# Patient Record
Sex: MALE | Race: WHITE | NOT HISPANIC OR LATINO | Employment: OTHER | URBAN - METROPOLITAN AREA
[De-identification: names, ages, dates, MRNs, and addresses within clinical notes are randomized per-mention and may not be internally consistent; named-entity substitution may affect disease eponyms.]

---

## 2020-02-03 ENCOUNTER — TELEPHONE (OUTPATIENT)
Dept: FAMILY MEDICINE | Facility: CLINIC | Age: 65
End: 2020-02-03

## 2020-02-03 DIAGNOSIS — I10 HYPERTENSION, UNSPECIFIED TYPE: ICD-10-CM

## 2020-02-03 DIAGNOSIS — Z79.899 ENCOUNTER FOR LONG-TERM (CURRENT) USE OF OTHER MEDICATIONS: ICD-10-CM

## 2020-02-03 DIAGNOSIS — Z00.00 ROUTINE GENERAL MEDICAL EXAMINATION AT A HEALTH CARE FACILITY: Primary | ICD-10-CM

## 2020-02-10 ENCOUNTER — TELEPHONE (OUTPATIENT)
Dept: FAMILY MEDICINE | Facility: CLINIC | Age: 65
End: 2020-02-10

## 2020-02-10 ENCOUNTER — CLINICAL SUPPORT (OUTPATIENT)
Dept: FAMILY MEDICINE | Facility: CLINIC | Age: 65
End: 2020-02-10
Payer: MEDICARE

## 2020-02-10 DIAGNOSIS — I10 HYPERTENSION, UNSPECIFIED TYPE: Primary | ICD-10-CM

## 2020-02-10 DIAGNOSIS — Z79.899 ENCOUNTER FOR LONG-TERM (CURRENT) USE OF OTHER MEDICATIONS: ICD-10-CM

## 2020-02-10 RX ORDER — CHLORTHALIDONE 25 MG/1
25 TABLET ORAL DAILY
Qty: 90 TABLET | Refills: 1 | Status: SHIPPED | OUTPATIENT
Start: 2020-02-10 | End: 2020-02-17 | Stop reason: SDUPTHER

## 2020-02-10 RX ORDER — SULFAMETHOXAZOLE AND TRIMETHOPRIM 800; 160 MG/1; MG/1
1 TABLET ORAL 2 TIMES DAILY
Qty: 180 TABLET | Refills: 1 | Status: SHIPPED | OUTPATIENT
Start: 2020-02-10 | End: 2020-02-17 | Stop reason: SDUPTHER

## 2020-02-10 RX ORDER — CHLORTHALIDONE 25 MG/1
25 TABLET ORAL DAILY
COMMUNITY
End: 2020-02-10 | Stop reason: SDUPTHER

## 2020-02-10 RX ORDER — CLONIDINE HYDROCHLORIDE 0.1 MG/1
0.1 TABLET ORAL ONCE
COMMUNITY
End: 2020-02-10 | Stop reason: SDUPTHER

## 2020-02-10 RX ORDER — CLONIDINE HYDROCHLORIDE 0.1 MG/1
0.1 TABLET ORAL ONCE
Qty: 90 TABLET | Refills: 1 | Status: SHIPPED | OUTPATIENT
Start: 2020-02-10 | End: 2020-02-17

## 2020-02-10 RX ORDER — SULFAMETHOXAZOLE AND TRIMETHOPRIM 800; 160 MG/1; MG/1
1 TABLET ORAL 2 TIMES DAILY
COMMUNITY
End: 2020-02-10 | Stop reason: SDUPTHER

## 2020-02-10 NOTE — TELEPHONE ENCOUNTER
----- Message from Nesha Langford sent at 2/10/2020 11:55 AM CST -----  Pt needs refill on 90 day supply of chlorthalidone 25mg, clonidine .1mg, bactrim ds. Would like to  a hard scripts because he is not sure where he wants to drop off.   [phone]

## 2020-02-10 NOTE — PROGRESS NOTES
Pt brought 3 bottles of meds in. He needs refills on ( chlorthalidone,clonidine, bactrim ds) printed out tb

## 2020-02-17 ENCOUNTER — OFFICE VISIT (OUTPATIENT)
Dept: FAMILY MEDICINE | Facility: CLINIC | Age: 65
End: 2020-02-17
Payer: MEDICARE

## 2020-02-17 VITALS
SYSTOLIC BLOOD PRESSURE: 144 MMHG | BODY MASS INDEX: 32.89 KG/M2 | HEIGHT: 68 IN | WEIGHT: 217 LBS | DIASTOLIC BLOOD PRESSURE: 98 MMHG | HEART RATE: 74 BPM

## 2020-02-17 DIAGNOSIS — I10 HYPERTENSION, UNSPECIFIED TYPE: Primary | ICD-10-CM

## 2020-02-17 DIAGNOSIS — Z12.11 SPECIAL SCREENING FOR MALIGNANT NEOPLASMS, COLON: ICD-10-CM

## 2020-02-17 DIAGNOSIS — Z12.5 SPECIAL SCREENING FOR MALIGNANT NEOPLASM OF PROSTATE: ICD-10-CM

## 2020-02-17 DIAGNOSIS — N41.1 CHRONIC PROSTATITIS: ICD-10-CM

## 2020-02-17 DIAGNOSIS — N52.9 ERECTILE DYSFUNCTION, UNSPECIFIED ERECTILE DYSFUNCTION TYPE: ICD-10-CM

## 2020-02-17 DIAGNOSIS — Z79.899 ENCOUNTER FOR LONG-TERM (CURRENT) USE OF OTHER MEDICATIONS: ICD-10-CM

## 2020-02-17 DIAGNOSIS — M50.00 CERVICAL DISC DISEASE WITH MYELOPATHY: ICD-10-CM

## 2020-02-17 DIAGNOSIS — M51.9 LUMBAR DISC DISEASE: ICD-10-CM

## 2020-02-17 DIAGNOSIS — N40.0 BENIGN PROSTATIC HYPERPLASIA WITHOUT LOWER URINARY TRACT SYMPTOMS: ICD-10-CM

## 2020-02-17 DIAGNOSIS — I10 ESSENTIAL HYPERTENSION: ICD-10-CM

## 2020-02-17 PROCEDURE — 99214 OFFICE O/P EST MOD 30 MIN: CPT | Mod: S$GLB,,, | Performed by: FAMILY MEDICINE

## 2020-02-17 PROCEDURE — 99214 PR OFFICE/OUTPT VISIT, EST, LEVL IV, 30-39 MIN: ICD-10-PCS | Mod: S$GLB,,, | Performed by: FAMILY MEDICINE

## 2020-02-17 RX ORDER — CHLORTHALIDONE 25 MG/1
25 TABLET ORAL DAILY
Qty: 90 TABLET | Refills: 1 | Status: SHIPPED | OUTPATIENT
Start: 2020-02-17 | End: 2020-08-19 | Stop reason: SDUPTHER

## 2020-02-17 RX ORDER — CLONIDINE HYDROCHLORIDE 0.1 MG/1
0.1 TABLET ORAL 2 TIMES DAILY
Qty: 180 TABLET | Refills: 1 | Status: SHIPPED | OUTPATIENT
Start: 2020-02-17 | End: 2020-08-19 | Stop reason: SDUPTHER

## 2020-02-17 RX ORDER — SULFAMETHOXAZOLE AND TRIMETHOPRIM 800; 160 MG/1; MG/1
1 TABLET ORAL 2 TIMES DAILY
Qty: 180 TABLET | Refills: 1 | Status: SHIPPED | OUTPATIENT
Start: 2020-02-17 | End: 2020-05-17

## 2020-02-17 NOTE — PROGRESS NOTES
SUBJECTIVE:    Patient ID: Pineda Grubbs is a 64 y.o. male.    Chief Complaint: Follow-up (6 month//no bottles tb//refused flu,pna shot//order for cscope)    This 64-year-old male measures his blood pressure several times a day at home.  He runs 116-130 systolic over 80-90 diastolic at home.  He has severe white coat hypertension and always has elevated blood pressures in a physician's office.  He does have some chronic low back pains which he likes to use a TNS unit or acupressure to help relieve some sciatica type symptoms.  He has chronic prostatitis and prostate pain. He takes Bactrim DS twice a day for the prostate.  He uses a TNS unit also help the prostate pain resolved for hours.  This patient has no known coronary artery disease but is at risk for that due to his father having a CABG and a valve replacement.    Patient has been working a little bit in the [employer] but has not been doing any vigorous aerobic exercise.  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    He has not had a colonoscopy in over 10 years.  He now agrees to pursue this with Dr. James.      No visits with results within 6 Month(s) from this visit.   Latest known visit with results is:   No results found for any previous visit.       Past Medical History:   Diagnosis Date    Chronic prostatitis     Hypertension      No past surgical history on file.  No family history on file.    Marital Status:   Alcohol History:  reports that he drinks alcohol.  Tobacco History:  reports that he has never smoked. He has never used smokeless tobacco.  Drug History:  reports that he does not use drugs.    Review of patient's allergies indicates:  No Known Allergies    Current Outpatient Medications:     chlorthalidone (HYGROTEN) 25 MG Tab, Take 1 tablet (25 mg total) by mouth once daily., Disp: 90 tablet, Rfl: 1    cloNIDine (CATAPRES)  "0.1 MG tablet, Take 1 tablet (0.1 mg total) by mouth 2 (two) times daily., Disp: 180 tablet, Rfl: 1    sulfamethoxazole-trimethoprim 800-160mg (BACTRIM DS) 800-160 mg Tab, Take 1 tablet by mouth 2 (two) times daily., Disp: 180 tablet, Rfl: 1    Review of Systems   Constitutional: Negative for appetite change, chills, fatigue, fever and unexpected weight change.   HENT: Negative for congestion (springtime allergies, uses otc meds), ear pain and trouble swallowing.    Eyes: Negative for pain, discharge and visual disturbance.   Respiratory: Negative for apnea, cough (clear mucus), shortness of breath and wheezing.    Cardiovascular: Negative for chest pain and leg swelling.   Gastrointestinal: Negative for abdominal pain, blood in stool, constipation, diarrhea, nausea and vomiting.        Bm's  Alternate q 2-3  Days  With daily bm's,.dietary changes can cause it w/ travel & fast foods   Endocrine: Negative for cold intolerance, heat intolerance and polydipsia.   Genitourinary: Negative for dysuria, hematuria, testicular pain and urgency.        Chronic prostate pain , w/ dribbling, Using a TNS units,freq uses Bactrim ds    Musculoskeletal: Positive for arthralgias (knees  ache,fell on his knees recently , Aleve sparingly). Negative for gait problem, joint swelling and myalgias.   Neurological: Negative for dizziness, seizures and numbness.   Psychiatric/Behavioral: Negative for agitation, behavioral problems and hallucinations. The patient is not nervous/anxious.           Objective:      Vitals:    02/17/20 0751 02/17/20 0755   BP: (!) 142/98 (!) 144/98   Pulse: 74    Weight: 98.4 kg (217 lb)    Height: 5' 8" (1.727 m)      Body mass index is 32.99 kg/m².  Physical Exam   Constitutional: He is oriented to person, place, and time. He appears well-developed and well-nourished.   Mildly obese white male in no apparent distress   HENT:   Head: Normocephalic and atraumatic.   Right Ear: External ear normal.   Left Ear: " External ear normal.   Nose: Nose normal.   Mouth/Throat: Oropharynx is clear and moist.   Eyes: Pupils are equal, round, and reactive to light. EOM are normal.   Neck: Normal range of motion. Neck supple. Carotid bruit is not present. No thyromegaly present.   Cardiovascular: Normal rate, regular rhythm, normal heart sounds and intact distal pulses.   No murmur heard.  Pulmonary/Chest: Effort normal and breath sounds normal. He has no wheezes. He has no rales.   Abdominal: Soft. Bowel sounds are normal. He exhibits no distension. There is no hepatosplenomegaly. There is no tenderness.   Abdomen nontender but mildly obese   Musculoskeletal: Normal range of motion. He exhibits no tenderness or deformity.        Lumbar back: Normal. He exhibits no pain and no spasm.   Bends 90 degrees at  waist shoulders and knees have full range of motion.  There is some pain in the right shoulder with manipulation.   Lymphadenopathy:     He has no cervical adenopathy.   Neurological: He is alert and oriented to person, place, and time. No cranial nerve deficit. Coordination normal.   Skin: Skin is warm and dry. No rash noted.   Psychiatric: He has a normal mood and affect. His behavior is normal. Judgment and thought content normal.   Nursing note and vitals reviewed.        Assessment:       1. Hypertension, unspecified type    2. Encounter for long-term (current) use of other medications    3. Lumbar disc disease    4. Cervical disc disease with myelopathy    5. Essential hypertension    6. Erectile dysfunction, unspecified erectile dysfunction type    7. Chronic prostatitis    8. Benign prostatic hyperplasia without lower urinary tract symptoms    9. Special screening for malignant neoplasms, colon    10. Special screening for malignant neoplasm of prostate         Plan:       Hypertension, unspecified type  -     cloNIDine (CATAPRES) 0.1 MG tablet; Take 1 tablet (0.1 mg total) by mouth 2 (two) times daily.  Dispense: 180 tablet;  Refill: 1  -     chlorthalidone (HYGROTEN) 25 MG Tab; Take 1 tablet (25 mg total) by mouth once daily.  Dispense: 90 tablet; Refill: 1  Patient is only able to tolerate clonidine and chlorthalidone for his blood pressures.  He has had numerous side effects with multiple other antihypertensive that have been tried.  Encounter for long-term (current) use of other medications  -     sulfamethoxazole-trimethoprim 800-160mg (BACTRIM DS) 800-160 mg Tab; Take 1 tablet by mouth 2 (two) times daily.  Dispense: 180 tablet; Refill: 1  Refill Bactrim DS for chronic prostatitis   Lumbar disc disease  He is managing this with conservative measures he does not like to take pain pills or over-the-counter medication.  TNS unit can be used  Cervical disc disease with myelopathy  Managing this conservatively  Essential hypertension  -     Comprehensive metabolic panel; Future; Expected date: 02/17/2020  -     Lipid panel; Future; Expected date: 02/17/2020  -     CBC auto differential; Future; Expected date: 02/17/2020  -     TSH w/reflex to FT4; Future; Expected date: 02/17/2020    Erectile dysfunction, unspecified erectile dysfunction type    Chronic prostatitis    Benign prostatic hyperplasia without lower urinary tract symptoms    Special screening for malignant neoplasms, colon  -     Ambulatory referral/consult to Gastroenterology; Future; Expected date: 02/24/2020  Referred to Dr. James for colonoscopy  Special screening for malignant neoplasm of prostate  -     PSA, Screening; Future; Expected date: 02/17/2020  Labs ordered for today.    Follow up in about 6 months (around 8/17/2020) for htn.

## 2020-02-18 LAB
ALBUMIN SERPL-MCNC: 4.3 G/DL (ref 3.6–5.1)
ALBUMIN/CREAT UR: 82 MCG/MG CREAT
ALBUMIN/GLOB SERPL: 1.5 (CALC) (ref 1–2.5)
ALP SERPL-CCNC: 70 U/L (ref 35–144)
ALT SERPL-CCNC: 33 U/L (ref 9–46)
APPEARANCE UR: CLEAR
AST SERPL-CCNC: 32 U/L (ref 10–35)
BACTERIA #/AREA URNS HPF: ABNORMAL /HPF
BACTERIA UR CULT: ABNORMAL
BASOPHILS # BLD AUTO: 39 CELLS/UL (ref 0–200)
BASOPHILS NFR BLD AUTO: 0.6 %
BILIRUB SERPL-MCNC: 0.4 MG/DL (ref 0.2–1.2)
BILIRUB UR QL STRIP: NEGATIVE
BUN SERPL-MCNC: 21 MG/DL (ref 7–25)
BUN/CREAT SERPL: ABNORMAL (CALC) (ref 6–22)
CALCIUM SERPL-MCNC: 9.5 MG/DL (ref 8.6–10.3)
CHLORIDE SERPL-SCNC: 102 MMOL/L (ref 98–110)
CHOLEST SERPL-MCNC: 225 MG/DL
CHOLEST/HDLC SERPL: 4 (CALC)
CO2 SERPL-SCNC: 27 MMOL/L (ref 20–32)
COLOR UR: YELLOW
CREAT SERPL-MCNC: 1.03 MG/DL (ref 0.7–1.25)
CREAT UR-MCNC: 184 MG/DL (ref 20–320)
EOSINOPHIL # BLD AUTO: 221 CELLS/UL (ref 15–500)
EOSINOPHIL NFR BLD AUTO: 3.4 %
ERYTHROCYTE [DISTWIDTH] IN BLOOD BY AUTOMATED COUNT: 13 % (ref 11–15)
GFRSERPLBLD MDRD-ARVRAT: 75 ML/MIN/1.73M2
GLOBULIN SER CALC-MCNC: 2.9 G/DL (CALC) (ref 1.9–3.7)
GLUCOSE SERPL-MCNC: 153 MG/DL (ref 65–99)
GLUCOSE UR QL STRIP: NEGATIVE
HCT VFR BLD AUTO: 49.5 % (ref 38.5–50)
HDLC SERPL-MCNC: 56 MG/DL
HGB BLD-MCNC: 16.5 G/DL (ref 13.2–17.1)
HGB UR QL STRIP: NEGATIVE
HYALINE CASTS #/AREA URNS LPF: ABNORMAL /LPF
KETONES UR QL STRIP: NEGATIVE
LDLC SERPL CALC-MCNC: 130 MG/DL (CALC)
LEUKOCYTE ESTERASE UR QL STRIP: NEGATIVE
LYMPHOCYTES # BLD AUTO: 2171 CELLS/UL (ref 850–3900)
LYMPHOCYTES NFR BLD AUTO: 33.4 %
MCH RBC QN AUTO: 30.6 PG (ref 27–33)
MCHC RBC AUTO-ENTMCNC: 33.3 G/DL (ref 32–36)
MCV RBC AUTO: 91.7 FL (ref 80–100)
MICROALBUMIN UR-MCNC: 15 MG/DL
MONOCYTES # BLD AUTO: 572 CELLS/UL (ref 200–950)
MONOCYTES NFR BLD AUTO: 8.8 %
NEUTROPHILS # BLD AUTO: 3497 CELLS/UL (ref 1500–7800)
NEUTROPHILS NFR BLD AUTO: 53.8 %
NITRITE UR QL STRIP: NEGATIVE
NONHDLC SERPL-MCNC: 169 MG/DL (CALC)
PH UR STRIP: 5.5 [PH] (ref 5–8)
PLATELET # BLD AUTO: 342 THOUSAND/UL (ref 140–400)
PMV BLD REES-ECKER: 9.8 FL (ref 7.5–12.5)
POTASSIUM SERPL-SCNC: 4 MMOL/L (ref 3.5–5.3)
PROT SERPL-MCNC: 7.2 G/DL (ref 6.1–8.1)
PROT UR QL STRIP: ABNORMAL
PSA SERPL-MCNC: 0.4 NG/ML
RBC # BLD AUTO: 5.4 MILLION/UL (ref 4.2–5.8)
RBC #/AREA URNS HPF: ABNORMAL /HPF
SODIUM SERPL-SCNC: 140 MMOL/L (ref 135–146)
SP GR UR STRIP: 1.02 (ref 1–1.03)
SQUAMOUS #/AREA URNS HPF: ABNORMAL /HPF
TRIGL SERPL-MCNC: 242 MG/DL
TSH SERPL-ACNC: 2.8 MIU/L (ref 0.4–4.5)
WBC # BLD AUTO: 6.5 THOUSAND/UL (ref 3.8–10.8)
WBC #/AREA URNS HPF: ABNORMAL /HPF

## 2020-02-24 ENCOUNTER — TELEPHONE (OUTPATIENT)
Dept: FAMILY MEDICINE | Facility: CLINIC | Age: 65
End: 2020-02-24

## 2020-02-24 NOTE — PROGRESS NOTES
Spoke to patient who understood the message and will try to change his diet. Remind Me for labs before OV on 8/19/ba

## 2020-02-24 NOTE — TELEPHONE ENCOUNTER
----- Message from Guero Perla MD sent at 2/22/2020  9:33 PM CST -----  Call patient.  His blood sugar was elevated up to 153.  Also cholesterol elevated to 225 triglycerides 242.  Kidneys liver, thyroid, prostate look normal.  Reduce fast food and fried food in the diet .  Recheck lipids and CMP in 6 months.

## 2020-05-04 ENCOUNTER — TELEPHONE (OUTPATIENT)
Dept: FAMILY MEDICINE | Facility: CLINIC | Age: 65
End: 2020-05-04

## 2020-05-04 NOTE — TELEPHONE ENCOUNTER
----- Message from Rimma Ribeiro sent at 5/4/2020 10:20 AM CDT -----  Pt is having trouble controlling his bp  Pt [phone]

## 2020-05-04 NOTE — TELEPHONE ENCOUNTER
Spoke to pt. He said BP keeps going high. 2 days ago afternoon around 2pm it was 238/112. Had dizziness & sweats, throwing up. Within 3 hours it was 189/105. Last night it was 123/77. Today at 10:30am its was 128/82. Recently increased his magnesium to 500mg QD. At times his BP will go as low as 102/57.     After telling me all of this he asked for an appt IN OFFICE with you. Made him an appt for tomorrow morning.    Printed for Dr Perla

## 2020-05-05 ENCOUNTER — TELEPHONE (OUTPATIENT)
Dept: FAMILY MEDICINE | Facility: CLINIC | Age: 65
End: 2020-05-05

## 2020-05-05 ENCOUNTER — OFFICE VISIT (OUTPATIENT)
Dept: FAMILY MEDICINE | Facility: CLINIC | Age: 65
End: 2020-05-05
Payer: MEDICARE

## 2020-05-05 VITALS
BODY MASS INDEX: 32.13 KG/M2 | DIASTOLIC BLOOD PRESSURE: 98 MMHG | SYSTOLIC BLOOD PRESSURE: 160 MMHG | WEIGHT: 212 LBS | TEMPERATURE: 99 F | HEIGHT: 68 IN | HEART RATE: 76 BPM

## 2020-05-05 DIAGNOSIS — N40.0 BENIGN PROSTATIC HYPERPLASIA WITHOUT LOWER URINARY TRACT SYMPTOMS: ICD-10-CM

## 2020-05-05 DIAGNOSIS — I10 ACCELERATED HYPERTENSION: Primary | ICD-10-CM

## 2020-05-05 DIAGNOSIS — M50.00 CERVICAL DISC DISEASE WITH MYELOPATHY: ICD-10-CM

## 2020-05-05 DIAGNOSIS — M51.9 LUMBAR DISC DISEASE: ICD-10-CM

## 2020-05-05 DIAGNOSIS — F41.9 ANXIETY: ICD-10-CM

## 2020-05-05 DIAGNOSIS — I10 ESSENTIAL HYPERTENSION: ICD-10-CM

## 2020-05-05 DIAGNOSIS — N41.1 CHRONIC PROSTATITIS: ICD-10-CM

## 2020-05-05 PROCEDURE — 99214 OFFICE O/P EST MOD 30 MIN: CPT | Mod: S$GLB,,, | Performed by: FAMILY MEDICINE

## 2020-05-05 PROCEDURE — 99214 PR OFFICE/OUTPT VISIT, EST, LEVL IV, 30-39 MIN: ICD-10-PCS | Mod: S$GLB,,, | Performed by: FAMILY MEDICINE

## 2020-05-05 RX ORDER — ALPRAZOLAM 0.5 MG/1
0.5 TABLET ORAL 2 TIMES DAILY PRN
Qty: 30 TABLET | Refills: 1 | Status: SHIPPED | OUTPATIENT
Start: 2020-05-05 | End: 2020-06-04

## 2020-05-05 NOTE — PROGRESS NOTES
SUBJECTIVE:    Patient ID: Pineda Grubbs is a 64 y.o. male.    Chief Complaint: Hypertension (going up and down, no bottles// SW)    This 64-year-old male comes in concerned because his labile hypertension.  He had an episode of sweating nausea vomiting in the 99 degree temperature he vomited many times that day and found his blood pressure to be 238/112.  He had been taking his chlorthalidone daily and once or twice a day clonidine 0.1 mg he took no extra medicine that day in the blood pressure slowly came down to 112/71 by that night.  Felt foggy all the next day but no muscle aches no fever.    Due to have back pain is [facility] which seemed to help the is back as long as he went 2-3 times per week.  The COVID virus is interrupted these sessions.    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  He still feels obligated help them succeed and will after remained involved with the company more than he planned.  His son plans on moving to [place] and patient's wife also wants to move to [place] to be near grandchildren and her kids.  Patient himself does not well as moved to [place].  Many heated discussions have been under taken the last few weeks.  He wonders if this type of stress is raising his blood pressure.      Office Visit on 02/17/2020   Component Date Value Ref Range Status    Glucose 02/17/2020 153* 65 - 99 mg/dL Final    BUN, Bld 02/17/2020 21  7 - 25 mg/dL Final    Creatinine 02/17/2020 1.03  0.70 - 1.25 mg/dL Final    eGFR if non African American 02/17/2020 75  > OR = 60 mL/min/1.73m2 Final    eGFR if African American 02/17/2020 87  > OR = 60 mL/min/1.73m2 Final    BUN/Creatinine Ratio 02/17/2020 NOT APPLICABLE  6 - 22 (calc) Final    Sodium 02/17/2020 140  135 - 146 mmol/L Final    Potassium 02/17/2020 4.0  3.5 - 5.3 mmol/L Final    Chloride 02/17/2020 102  98 - 110  mmol/L Final    CO2 02/17/2020 27  20 - 32 mmol/L Final    Calcium 02/17/2020 9.5  8.6 - 10.3 mg/dL Final    Total Protein 02/17/2020 7.2  6.1 - 8.1 g/dL Final    Albumin 02/17/2020 4.3  3.6 - 5.1 g/dL Final    Globulin, Total 02/17/2020 2.9  1.9 - 3.7 g/dL (calc) Final    Albumin/Globulin Ratio 02/17/2020 1.5  1.0 - 2.5 (calc) Final    Total Bilirubin 02/17/2020 0.4  0.2 - 1.2 mg/dL Final    Alkaline Phosphatase 02/17/2020 70  35 - 144 U/L Final    AST 02/17/2020 32  10 - 35 U/L Final    ALT 02/17/2020 33  9 - 46 U/L Final    Cholesterol 02/17/2020 225* <200 mg/dL Final    HDL 02/17/2020 56  > OR = 40 mg/dL Final    Triglycerides 02/17/2020 242* <150 mg/dL Final    LDL Cholesterol 02/17/2020 130* mg/dL (calc) Final    Hdl/Cholesterol Ratio 02/17/2020 4.0  <5.0 (calc) Final    Non HDL Chol. (LDL+VLDL) 02/17/2020 169* <130 mg/dL (calc) Final    PROSTATE SPECIFIC ANTIGEN, SCR - Q* 02/17/2020 0.4  < OR = 4.0 ng/mL Final    WBC 02/17/2020 6.5  3.8 - 10.8 Thousand/uL Final    RBC 02/17/2020 5.40  4.20 - 5.80 Million/uL Final    Hemoglobin 02/17/2020 16.5  13.2 - 17.1 g/dL Final    Hematocrit 02/17/2020 49.5  38.5 - 50.0 % Final    Mean Corpuscular Volume 02/17/2020 91.7  80.0 - 100.0 fL Final    Mean Corpuscular Hemoglobin 02/17/2020 30.6  27.0 - 33.0 pg Final    Mean Corpuscular Hemoglobin Conc 02/17/2020 33.3  32.0 - 36.0 g/dL Final    RDW 02/17/2020 13.0  11.0 - 15.0 % Final    Platelets 02/17/2020 342  140 - 400 Thousand/uL Final    MPV 02/17/2020 9.8  7.5 - 12.5 fL Final    Neutrophils Absolute 02/17/2020 3,497  1,500 - 7,800 cells/uL Final    Lymph # 02/17/2020 2,171  850 - 3,900 cells/uL Final    Mono # 02/17/2020 572  200 - 950 cells/uL Final    Eos # 02/17/2020 221  15 - 500 cells/uL Final    Baso # 02/17/2020 39  0 - 200 cells/uL Final    Neutrophils Relative 02/17/2020 53.8  % Final    Lymph% 02/17/2020 33.4  % Final    Mono% 02/17/2020 8.8  % Final    Eosinophil% 02/17/2020  3.4  % Final    Basophil% 02/17/2020 0.6  % Final    TSH w/reflex to FT4 02/17/2020 2.80  0.40 - 4.50 mIU/L Final    Creatinine, Random Ur 02/17/2020 184  20 - 320 mg/dL Final    Microalb, Ur 02/17/2020 15.0  See Note: mg/dL Final    Microalb Creat Ratio 02/17/2020 82* <30 mcg/mg creat Final    Color, UA 02/17/2020 YELLOW  YELLOW Final    Appearance, UA 02/17/2020 CLEAR  CLEAR Final    Specific [place], UA 02/17/2020 1.024  1.001 - 1.035 Final    pH, UA 02/17/2020 5.5  5.0 - 8.0 Final    Glucose, UA 02/17/2020 NEGATIVE  NEGATIVE Final    Bilirubin, UA 02/17/2020 NEGATIVE  NEGATIVE Final    Ketones, UA 02/17/2020 NEGATIVE  NEGATIVE Final    Occult Blood UA 02/17/2020 NEGATIVE  NEGATIVE Final    Protein, UA 02/17/2020 1+* NEGATIVE Final    Nitrite, UA 02/17/2020 NEGATIVE  NEGATIVE Final    Leukocytes, UA 02/17/2020 NEGATIVE  NEGATIVE Final    WBC Casts, UA 02/17/2020 0-5  < OR = 5 /HPF Final    RBC Casts, UA 02/17/2020 0-2  < OR = 2 /HPF Final    Squam Epithel, UA 02/17/2020 NONE SEEN  < OR = 5 /HPF Final    Bacteria, UA 02/17/2020 NONE SEEN  NONE SEEN /HPF Final    Hyaline Casts, UA 02/17/2020 NONE SEEN  NONE SEEN /LPF Final    Reflexive Urine Culture 02/17/2020 NO CULTURE INDICATED   Final       Past Medical History:   Diagnosis Date    Chronic prostatitis     Hypertension      History reviewed. No pertinent surgical history.  History reviewed. No pertinent family history.    Marital Status:   Alcohol History:  reports that he drinks alcohol.  Tobacco History:  reports that he has never smoked. He has never used smokeless tobacco.  Drug History:  reports that he does not use drugs.    Review of patient's allergies indicates:  No Known Allergies    Current Outpatient Medications:     ALPRAZolam (XANAX) 0.5 MG tablet, Take 1 tablet (0.5 mg total) by mouth 2 (two) times daily as needed for Anxiety., Disp: 30 tablet, Rfl: 1    chlorthalidone (HYGROTEN) 25 MG Tab, Take 1 tablet (25 mg  "total) by mouth once daily., Disp: 90 tablet, Rfl: 1    cloNIDine (CATAPRES) 0.1 MG tablet, Take 1 tablet (0.1 mg total) by mouth 2 (two) times daily., Disp: 180 tablet, Rfl: 1    sulfamethoxazole-trimethoprim 800-160mg (BACTRIM DS) 800-160 mg Tab, Take 1 tablet by mouth 2 (two) times daily., Disp: 180 tablet, Rfl: 1    Review of Systems   Constitutional: Negative for appetite change, chills, fatigue, fever and unexpected weight change.   HENT: Negative for congestion, ear pain and trouble swallowing.    Eyes: Negative for pain, discharge and visual disturbance.   Respiratory: Negative for apnea, cough, shortness of breath and wheezing.    Cardiovascular: Negative for chest pain and leg swelling.   Gastrointestinal: Negative for abdominal pain, blood in stool, constipation, diarrhea, nausea and vomiting.        Did have 1 episode of vomiting as described in history of present illness.   Endocrine: Negative for cold intolerance, heat intolerance and polydipsia.   Genitourinary: Negative for dysuria, hematuria, testicular pain and urgency.        Stays on prolonged Bactrim due to chronic prostatitis   Musculoskeletal: Positive for back pain (chiropracter helped the  back pains, but stopped due to covid -19). Negative for gait problem, joint swelling and myalgias.   Neurological: Negative for dizziness, seizures and numbness.   Psychiatric/Behavioral: Positive for decreased concentration, dysphoric mood and sleep disturbance (lots of planning the unsettled future). Negative for agitation, behavioral problems and hallucinations. The patient is nervous/anxious.         Lots of  Family stress, son moving to [place], low motivation , some  Depression,           Objective:      Vitals:    05/05/20 0826 05/05/20 0830   BP: (!) 148/92 (!) 160/98   Pulse: 76    Temp: 98.6 °F (37 °C)    Weight: 96.2 kg (212 lb)    Height: 5' 8" (1.727 m)      Body mass index is 32.23 kg/m².  Physical Exam   Constitutional: He is oriented to " person, place, and time. He appears well-developed and well-nourished.   HENT:   Head: Normocephalic and atraumatic.   Right Ear: External ear normal.   Left Ear: External ear normal.   Nose: Nose normal.   Mouth/Throat: Oropharynx is clear and moist.   Eyes: Pupils are equal, round, and reactive to light. EOM are normal.   Neck: Normal range of motion. Neck supple. Carotid bruit is not present. No thyromegaly present.   Cardiovascular: Normal rate, regular rhythm, normal heart sounds and intact distal pulses.   No murmur heard.  Pulmonary/Chest: Effort normal and breath sounds normal. He has no wheezes. He has no rales.   Abdominal: Soft. Bowel sounds are normal. He exhibits no distension. There is no hepatosplenomegaly. There is no tenderness.   Musculoskeletal: Normal range of motion. He exhibits no tenderness or deformity.        Lumbar back: Normal. He exhibits no pain and no spasm.   Bends 90 degrees at  Waist, crepitant knees  bilat    Lymphadenopathy:     He has no cervical adenopathy.   Neurological: He is alert and oriented to person, place, and time. No cranial nerve deficit. Coordination normal.   Skin: Skin is warm and dry. No rash noted.   Psychiatric: He has a normal mood and affect. His behavior is normal. Judgment and thought content normal.   Nursing note and vitals reviewed.        Assessment:       1. Accelerated hypertension    2. Lumbar disc disease    3. Cervical disc disease with myelopathy    4. Essential hypertension    5. Chronic prostatitis    6. Benign prostatic hyperplasia without lower urinary tract symptoms    7. Anxiety         Plan:       Accelerated hypertension  For year of patient's blood pressure diary shows extremely high pressures on 05/03/2020 all at 238/112.  S today blood pressures were averaging 119/80 to 106/60 without any additional medications.  Stress and anxiety could be playing a role.  Also suggested rare diseases such as pheochromocytoma can cause episodic spikes  up blood pressure.  Urine test and blood work can be ordered if this happens again.  Lumbar disc disease  Okay to resume chiropractic care when virus restrictions are lifted.  Cervical disc disease with myelopathy    Essential hypertension    Chronic prostatitis  Continue Bactrim DS daily  Benign prostatic hyperplasia without lower urinary tract symptoms    Anxiety  -     ALPRAZolam (XANAX) 0.5 MG tablet; Take 1 tablet (0.5 mg total) by mouth 2 (two) times daily as needed for Anxiety.  Dispense: 30 tablet; Refill: 1  Trial of alprazolam if stress gets out of hand.    No follow-ups on file.

## 2020-05-05 NOTE — TELEPHONE ENCOUNTER
----- Message from Pilar Medrano sent at 5/5/2020  2:52 PM CDT -----  Pt is requesting we email him a copy of the BP diary that he turned in to [email or web address]  cb # [phone]

## 2020-05-05 NOTE — TELEPHONE ENCOUNTER
----- Message from Saige Palomino MA sent at 5/5/2020  4:15 PM CDT -----  Pt print a copy of pt's BP log (he brought it in to the office today) and he will  at the office tomorrow.    He called in earlier about having a copy emailed to him.

## 2020-08-11 ENCOUNTER — TELEPHONE (OUTPATIENT)
Dept: FAMILY MEDICINE | Facility: CLINIC | Age: 65
End: 2020-08-11

## 2020-08-11 DIAGNOSIS — Z79.899 ENCOUNTER FOR LONG-TERM (CURRENT) USE OF OTHER MEDICATIONS: ICD-10-CM

## 2020-08-11 DIAGNOSIS — R73.09 ELEVATED GLUCOSE LEVEL: ICD-10-CM

## 2020-08-11 DIAGNOSIS — E78.5 HYPERLIPIDEMIA: Primary | ICD-10-CM

## 2020-08-11 NOTE — TELEPHONE ENCOUNTER
----- Message from Domi Avila sent at 2/24/2020  2:32 PM CST -----  Notes recorded by Guero Perla MD on 2/22/2020 at 9:33 PM CST  Call patient.  His blood sugar was elevated up to 153.  Also cholesterol elevated to 225 triglycerides 242.  Kidneys liver, thyroid, prostate look normal.  Reduce fast food and fried food in the diet .  Recheck lipids and CMP in 6 months.

## 2020-08-12 ENCOUNTER — TELEPHONE (OUTPATIENT)
Dept: FAMILY MEDICINE | Facility: CLINIC | Age: 65
End: 2020-08-12

## 2020-08-12 NOTE — TELEPHONE ENCOUNTER
Spoke with pt who refused vv.audio. Wants to come in office only. Patient was notified to bring all medication bottles to the visit, aware that we are not allowing family members at this time and aware they need to be wearing a mask to the visit.

## 2020-08-15 LAB
ALBUMIN SERPL-MCNC: 4.3 G/DL (ref 3.6–5.1)
ALBUMIN/GLOB SERPL: 1.7 (CALC) (ref 1–2.5)
ALP SERPL-CCNC: 66 U/L (ref 35–144)
ALT SERPL-CCNC: 38 U/L (ref 9–46)
AST SERPL-CCNC: 42 U/L (ref 10–35)
BILIRUB SERPL-MCNC: 0.7 MG/DL (ref 0.2–1.2)
BUN SERPL-MCNC: 15 MG/DL (ref 7–25)
BUN/CREAT SERPL: ABNORMAL (CALC) (ref 6–22)
CALCIUM SERPL-MCNC: 9.3 MG/DL (ref 8.6–10.3)
CHLORIDE SERPL-SCNC: 100 MMOL/L (ref 98–110)
CHOLEST SERPL-MCNC: 230 MG/DL
CHOLEST/HDLC SERPL: 5.2 (CALC)
CO2 SERPL-SCNC: 32 MMOL/L (ref 20–32)
CREAT SERPL-MCNC: 0.85 MG/DL (ref 0.7–1.25)
GFRSERPLBLD MDRD-ARVRAT: 91 ML/MIN/1.73M2
GLOBULIN SER CALC-MCNC: 2.6 G/DL (CALC) (ref 1.9–3.7)
GLUCOSE SERPL-MCNC: 189 MG/DL (ref 65–99)
HDLC SERPL-MCNC: 44 MG/DL
LDLC SERPL CALC-MCNC: 141 MG/DL (CALC)
NONHDLC SERPL-MCNC: 186 MG/DL (CALC)
POTASSIUM SERPL-SCNC: 3.3 MMOL/L (ref 3.5–5.3)
PROT SERPL-MCNC: 6.9 G/DL (ref 6.1–8.1)
SODIUM SERPL-SCNC: 139 MMOL/L (ref 135–146)
TRIGL SERPL-MCNC: 318 MG/DL

## 2020-08-19 ENCOUNTER — OFFICE VISIT (OUTPATIENT)
Dept: FAMILY MEDICINE | Facility: CLINIC | Age: 65
End: 2020-08-19
Payer: MEDICARE

## 2020-08-19 VITALS
HEART RATE: 80 BPM | SYSTOLIC BLOOD PRESSURE: 146 MMHG | BODY MASS INDEX: 31.67 KG/M2 | DIASTOLIC BLOOD PRESSURE: 92 MMHG | TEMPERATURE: 98 F | WEIGHT: 209 LBS | HEIGHT: 68 IN

## 2020-08-19 DIAGNOSIS — M70.62 TROCHANTERIC BURSITIS OF BOTH HIPS: ICD-10-CM

## 2020-08-19 DIAGNOSIS — R73.9 HYPERGLYCEMIA: ICD-10-CM

## 2020-08-19 DIAGNOSIS — M70.61 TROCHANTERIC BURSITIS OF BOTH HIPS: ICD-10-CM

## 2020-08-19 DIAGNOSIS — E11.65 UNCONTROLLED TYPE 2 DIABETES MELLITUS WITH HYPERGLYCEMIA: Primary | ICD-10-CM

## 2020-08-19 DIAGNOSIS — N41.1 CHRONIC PROSTATITIS: ICD-10-CM

## 2020-08-19 DIAGNOSIS — Z12.11 SPECIAL SCREENING FOR MALIGNANT NEOPLASMS, COLON: ICD-10-CM

## 2020-08-19 DIAGNOSIS — I10 ESSENTIAL HYPERTENSION: ICD-10-CM

## 2020-08-19 DIAGNOSIS — N52.9 ERECTILE DYSFUNCTION, UNSPECIFIED ERECTILE DYSFUNCTION TYPE: ICD-10-CM

## 2020-08-19 DIAGNOSIS — M51.9 LUMBAR DISC DISEASE: ICD-10-CM

## 2020-08-19 DIAGNOSIS — E11.9 TYPE 2 DIABETES MELLITUS WITHOUT COMPLICATION, WITHOUT LONG-TERM CURRENT USE OF INSULIN: ICD-10-CM

## 2020-08-19 DIAGNOSIS — I10 HYPERTENSION, UNSPECIFIED TYPE: ICD-10-CM

## 2020-08-19 DIAGNOSIS — N40.0 BENIGN PROSTATIC HYPERPLASIA WITHOUT LOWER URINARY TRACT SYMPTOMS: ICD-10-CM

## 2020-08-19 DIAGNOSIS — M50.00 CERVICAL DISC DISEASE WITH MYELOPATHY: ICD-10-CM

## 2020-08-19 PROBLEM — M70.60 TROCHANTERIC BURSITIS: Status: ACTIVE | Noted: 2020-08-19

## 2020-08-19 PROCEDURE — 83036 HEMOGLOBIN GLYCOSYLATED A1C: CPT | Mod: QW,,, | Performed by: FAMILY MEDICINE

## 2020-08-19 PROCEDURE — 99214 PR OFFICE/OUTPT VISIT, EST, LEVL IV, 30-39 MIN: ICD-10-PCS | Mod: S$GLB,,, | Performed by: FAMILY MEDICINE

## 2020-08-19 PROCEDURE — 99214 OFFICE O/P EST MOD 30 MIN: CPT | Mod: S$GLB,,, | Performed by: FAMILY MEDICINE

## 2020-08-19 PROCEDURE — 83036 POCT HEMOGLOBIN A1C: ICD-10-PCS | Mod: QW,,, | Performed by: FAMILY MEDICINE

## 2020-08-19 RX ORDER — CHLORTHALIDONE 25 MG/1
25 TABLET ORAL DAILY
Qty: 90 TABLET | Refills: 1 | Status: SHIPPED | OUTPATIENT
Start: 2020-08-19 | End: 2021-01-25 | Stop reason: SDUPTHER

## 2020-08-19 RX ORDER — CLONIDINE HYDROCHLORIDE 0.1 MG/1
0.1 TABLET ORAL 2 TIMES DAILY
Qty: 180 TABLET | Refills: 1 | Status: SHIPPED | OUTPATIENT
Start: 2020-08-19 | End: 2021-01-25 | Stop reason: SDUPTHER

## 2020-08-19 RX ORDER — METFORMIN HYDROCHLORIDE 500 MG/1
500 TABLET ORAL 2 TIMES DAILY WITH MEALS
Qty: 60 TABLET | Refills: 3 | Status: SHIPPED | OUTPATIENT
Start: 2020-08-19 | End: 2021-01-25 | Stop reason: SDUPTHER

## 2020-08-19 RX ORDER — SULFAMETHOXAZOLE AND TRIMETHOPRIM 800; 160 MG/1; MG/1
1 TABLET ORAL DAILY PRN
COMMUNITY
End: 2021-06-15 | Stop reason: SDUPTHER

## 2020-08-19 NOTE — PROGRESS NOTES
SUBJECTIVE:    Patient ID: Pineda Grubbs is a 64 y.o. male.    Chief Complaint: Hypertension (did not bring bottles // wants to talk about medication // pneumonia - refused // colonoscopy - Due.ac )    This 64-year-old male has hypertension and elevated blood sugar.  Knows that he has been having increased number of soft drinks, pasta, fast foods and using sheng's seasoning on much of his food.  Blood pressures have been running 130s over 80s at home.  His weight has actually dropped from 217 down to 209.  Unfortunately his A1c is 8.7 today.    He has been seeing a chiropractor for chronic lower lumbar disc pains.  He has also been suffering from trochanteric bursitis of the hips.    He has chronic prostatitis which we are treating with Bactrim pulses frequently.  He has seen a urologist in the past about this      Telephone on 08/11/2020   Component Date Value Ref Range Status    Glucose 08/14/2020 189* 65 - 99 mg/dL Final    BUN, Bld 08/14/2020 15  7 - 25 mg/dL Final    Creatinine 08/14/2020 0.85  0.70 - 1.25 mg/dL Final    eGFR if non African American 08/14/2020 91  > OR = 60 mL/min/1.73m2 Final    eGFR if African American 08/14/2020 105  > OR = 60 mL/min/1.73m2 Final    BUN/Creatinine Ratio 08/14/2020 NOT APPLICABLE  6 - 22 (calc) Final    Sodium 08/14/2020 139  135 - 146 mmol/L Final    Potassium 08/14/2020 3.3* 3.5 - 5.3 mmol/L Final    Chloride 08/14/2020 100  98 - 110 mmol/L Final    CO2 08/14/2020 32  20 - 32 mmol/L Final    Calcium 08/14/2020 9.3  8.6 - 10.3 mg/dL Final    Total Protein 08/14/2020 6.9  6.1 - 8.1 g/dL Final    Albumin 08/14/2020 4.3  3.6 - 5.1 g/dL Final    Globulin, Total 08/14/2020 2.6  1.9 - 3.7 g/dL (calc) Final    Albumin/Globulin Ratio 08/14/2020 1.7  1.0 - 2.5 (calc) Final    Total Bilirubin 08/14/2020 0.7  0.2 - 1.2 mg/dL Final    Alkaline Phosphatase 08/14/2020 66  35 - 144 U/L Final    AST 08/14/2020 42* 10 - 35 U/L Final    ALT 08/14/2020 38  9 - 46 U/L  Final    Cholesterol 08/14/2020 230* <200 mg/dL Final    HDL 08/14/2020 44  > OR = 40 mg/dL Final    Triglycerides 08/14/2020 318* <150 mg/dL Final    LDL Cholesterol 08/14/2020 141* mg/dL (calc) Final    Hdl/Cholesterol Ratio 08/14/2020 5.2* <5.0 (calc) Final    Non HDL Chol. (LDL+VLDL) 08/14/2020 186* <130 mg/dL (calc) Final       Past Medical History:   Diagnosis Date    Chronic prostatitis     Hypertension      Social History     Socioeconomic History    Marital status:      Spouse name: Not on file    Number of children: Not on file    Years of education: Not on file    Highest education level: Not on file   Occupational History    Not on file   Social Needs    Financial resource strain: Not on file    Food insecurity     Worry: Not on file     Inability: Not on file    Transportation needs     Medical: Not on file     Non-medical: Not on file   Tobacco Use    Smoking status: Never Smoker    Smokeless tobacco: Never Used   Substance and Sexual Activity    Alcohol use: Yes     Comment: occ    Drug use: Never    Sexual activity: Not on file   Lifestyle    Physical activity     Days per week: Not on file     Minutes per session: Not on file    Stress: Not on file   Relationships    Social connections     Talks on phone: Not on file     Gets together: Not on file     Attends Dru[employer] service: Not on file     Active member of club or organization: Not on file     Attends meetings of clubs or organizations: Not on file     Relationship status: Not on file   Other Topics Concern    Not on file   Social History Narrative    Not on file     No past surgical history on file.  No family history on file.    Review of patient's allergies indicates:  No Known Allergies    Current Outpatient Medications:     sulfamethoxazole-trimethoprim 800-160mg (BACTRIM DS) 800-160 mg Tab, Take 1 tablet by mouth daily as needed., Disp: , Rfl:     ALPRAZolam (XANAX) 0.5 MG tablet, Take 1 tablet (0.5 mg  "total) by mouth 2 (two) times daily as needed for Anxiety. (Patient not taking: Reported on 8/19/2020), Disp: 30 tablet, Rfl: 1    chlorthalidone (HYGROTEN) 25 MG Tab, Take 1 tablet (25 mg total) by mouth once daily., Disp: 90 tablet, Rfl: 1    cloNIDine (CATAPRES) 0.1 MG tablet, Take 1 tablet (0.1 mg total) by mouth 2 (two) times daily., Disp: 180 tablet, Rfl: 1    metFORMIN (GLUCOPHAGE) 500 MG tablet, Take 1 tablet (500 mg total) by mouth 2 (two) times daily with meals., Disp: 60 tablet, Rfl: 3    Review of Systems   Constitutional: Negative for appetite change, chills, fatigue, fever and unexpected weight change.   HENT: Negative for congestion, ear pain and trouble swallowing.    Eyes: Negative for pain, discharge and visual disturbance.   Respiratory: Negative for apnea, cough, shortness of breath and wheezing.    Cardiovascular: Positive for chest pain (rare twinge in chest). Negative for leg swelling.   Gastrointestinal: Negative for abdominal pain, blood in stool, constipation, diarrhea, nausea and vomiting.   Endocrine: Negative for cold intolerance, heat intolerance and polydipsia.   Genitourinary: Negative for dysuria, hematuria, testicular pain and urgency.        Prostate  Aches  freq , uses  Bactrim ds   Musculoskeletal: Positive for back pain (Frequently has lower back pains and lumbar disc disease.). Negative for gait problem, joint swelling and myalgias.   Neurological: Negative for dizziness, seizures and numbness.   Psychiatric/Behavioral: Negative for agitation, behavioral problems and hallucinations. The patient is not nervous/anxious.           Objective:      Vitals:    08/19/20 0827 08/19/20 0829   BP: (!) 146/90 (!) 146/92   Pulse: 80    Temp: 97.5 °F (36.4 °C)    Weight: 94.8 kg (209 lb)    Height: 5' 8" (1.727 m)      Physical Exam  Vitals signs and nursing note reviewed.   Constitutional:       Appearance: He is well-developed.   HENT:      Head: Normocephalic and atraumatic.      " Right Ear: External ear normal.      Left Ear: External ear normal.      Nose: Nose normal.   Eyes:      Pupils: Pupils are equal, round, and reactive to light.   Neck:      Musculoskeletal: Normal range of motion and neck supple.      Thyroid: No thyromegaly.      Vascular: No carotid bruit.   Cardiovascular:      Rate and Rhythm: Normal rate and regular rhythm.      Heart sounds: Normal heart sounds. No murmur.   Pulmonary:      Effort: Pulmonary effort is normal.      Breath sounds: Normal breath sounds. No wheezing or rales.   Abdominal:      General: Bowel sounds are normal. There is no distension.      Palpations: Abdomen is soft.      Tenderness: There is no abdominal tenderness.   Musculoskeletal: Normal range of motion.         General: No tenderness or deformity.      Lumbar back: Normal. He exhibits no pain and no spasm.      Comments: Bends 60 degrees at  waist, back muscles are tight, knees are somewhat crepitant.  Shoulders have good range of motion.   Lymphadenopathy:      Cervical: No cervical adenopathy.   Skin:     General: Skin is warm and dry.      Findings: No rash.   Neurological:      Mental Status: He is alert and oriented to person, place, and time.      Cranial Nerves: No cranial nerve deficit.      Coordination: Coordination normal.   Psychiatric:         Behavior: Behavior normal.         Thought Content: Thought content normal.         Judgment: Judgment normal.           Assessment:       1. Uncontrolled type 2 diabetes mellitus with hyperglycemia    2. Special screening for malignant neoplasms, colon    3. Hypertension, unspecified type    4. Type 2 diabetes mellitus without complication, without long-term current use of insulin    5. Hyperglycemia    6. Cervical disc disease with myelopathy    7. Lumbar disc disease    8. Essential hypertension    9. Benign prostatic hyperplasia without lower urinary tract symptoms    10. Chronic prostatitis    11. Erectile dysfunction, unspecified  erectile dysfunction type    12. Trochanteric bursitis of both hips         Plan:       Uncontrolled type 2 diabetes mellitus with hyperglycemia  A1c today is 8.7 indicating uncontrolled diabetes mellitus.  Add metformin 500 mg b.i.d. reduce bread rice pasta and potatoes  Special screening for malignant neoplasms, colon  -     Ambulatory referral/consult to Gastroenterology   He is due for colonoscopy  Hypertension, unspecified type  -     cloNIDine (CATAPRES) 0.1 MG tablet; Take 1 tablet (0.1 mg total) by mouth 2 (two) times daily.  Dispense: 180 tablet; Refill: 1  -     chlorthalidone (HYGROTEN) 25 MG Tab; Take 1 tablet (25 mg total) by mouth once daily.  Dispense: 90 tablet; Refill: 1  Blood pressure has been improving, he does have many risk factors for heart disease and I would like to refer him to cardiology to get a baseline cardiac evaluation.  Type 2 diabetes mellitus without complication, without long-term current use of insulin  -     metFORMIN (GLUCOPHAGE) 500 MG tablet; Take 1 tablet (500 mg total) by mouth 2 (two) times daily with meals.  Dispense: 60 tablet; Refill: 3    Hyperglycemia  -     POCT HEMOGLOBIN A1C  A1c 8.7  Cervical disc disease with myelopathy    Lumbar disc disease  Continue conservative measures for his back, seeing a chiropractor as needed  Essential hypertension    Benign prostatic hyperplasia without lower urinary tract symptoms    Chronic prostatitis  Will need urology referral if not improving  Erectile dysfunction, unspecified erectile dysfunction type    Trochanteric bursitis of both hips  He wishes to try conservative measures, will offer cortisone injection if this fails    Follow up in about 3 months (around 11/19/2020) for Diabetic Check-Up.        8/19/2020 Guero Perla

## 2020-08-20 LAB — HBA1C MFR BLD: 8.7 %

## 2020-11-24 ENCOUNTER — TELEPHONE (OUTPATIENT)
Dept: FAMILY MEDICINE | Facility: CLINIC | Age: 65
End: 2020-11-24

## 2021-01-25 DIAGNOSIS — E11.9 TYPE 2 DIABETES MELLITUS WITHOUT COMPLICATION, WITHOUT LONG-TERM CURRENT USE OF INSULIN: ICD-10-CM

## 2021-01-25 DIAGNOSIS — I10 HYPERTENSION, UNSPECIFIED TYPE: ICD-10-CM

## 2021-01-25 RX ORDER — METFORMIN HYDROCHLORIDE 500 MG/1
500 TABLET ORAL 2 TIMES DAILY WITH MEALS
Qty: 60 TABLET | Refills: 1 | Status: SHIPPED | OUTPATIENT
Start: 2021-01-25 | End: 2021-03-01 | Stop reason: SDUPTHER

## 2021-01-25 RX ORDER — CLONIDINE HYDROCHLORIDE 0.1 MG/1
0.1 TABLET ORAL 2 TIMES DAILY
Qty: 60 TABLET | Refills: 1 | Status: SHIPPED | OUTPATIENT
Start: 2021-01-25 | End: 2021-03-01 | Stop reason: SDUPTHER

## 2021-01-25 RX ORDER — CHLORTHALIDONE 25 MG/1
25 TABLET ORAL DAILY
Qty: 30 TABLET | Refills: 1 | Status: SHIPPED | OUTPATIENT
Start: 2021-01-25 | End: 2021-03-01 | Stop reason: SDUPTHER

## 2021-02-26 ENCOUNTER — TELEPHONE (OUTPATIENT)
Dept: FAMILY MEDICINE | Facility: CLINIC | Age: 66
End: 2021-02-26

## 2021-03-01 ENCOUNTER — OFFICE VISIT (OUTPATIENT)
Dept: FAMILY MEDICINE | Facility: CLINIC | Age: 66
End: 2021-03-01
Payer: MEDICARE

## 2021-03-01 VITALS
WEIGHT: 205 LBS | HEART RATE: 84 BPM | DIASTOLIC BLOOD PRESSURE: 90 MMHG | HEIGHT: 68 IN | SYSTOLIC BLOOD PRESSURE: 168 MMHG | BODY MASS INDEX: 31.07 KG/M2

## 2021-03-01 DIAGNOSIS — I10 HYPERTENSION, UNSPECIFIED TYPE: ICD-10-CM

## 2021-03-01 DIAGNOSIS — E11.9 TYPE 2 DIABETES MELLITUS WITHOUT COMPLICATION, WITHOUT LONG-TERM CURRENT USE OF INSULIN: Primary | ICD-10-CM

## 2021-03-01 DIAGNOSIS — I10 ESSENTIAL HYPERTENSION: ICD-10-CM

## 2021-03-01 DIAGNOSIS — E11.65 UNCONTROLLED TYPE 2 DIABETES MELLITUS WITH HYPERGLYCEMIA: ICD-10-CM

## 2021-03-01 DIAGNOSIS — N41.1 CHRONIC PROSTATITIS: ICD-10-CM

## 2021-03-01 DIAGNOSIS — N40.0 BENIGN PROSTATIC HYPERPLASIA WITHOUT LOWER URINARY TRACT SYMPTOMS: ICD-10-CM

## 2021-03-01 DIAGNOSIS — Z12.5 SPECIAL SCREENING FOR MALIGNANT NEOPLASM OF PROSTATE: ICD-10-CM

## 2021-03-01 DIAGNOSIS — I10 ACCELERATED HYPERTENSION: ICD-10-CM

## 2021-03-01 DIAGNOSIS — M51.9 LUMBAR DISC DISEASE: ICD-10-CM

## 2021-03-01 DIAGNOSIS — Z12.11 SPECIAL SCREENING FOR MALIGNANT NEOPLASMS, COLON: ICD-10-CM

## 2021-03-01 DIAGNOSIS — M50.00 CERVICAL DISC DISEASE WITH MYELOPATHY: ICD-10-CM

## 2021-03-01 DIAGNOSIS — N52.9 ERECTILE DYSFUNCTION, UNSPECIFIED ERECTILE DYSFUNCTION TYPE: ICD-10-CM

## 2021-03-01 LAB — HBA1C MFR BLD: 6.1 %

## 2021-03-01 PROCEDURE — 83036 POCT HEMOGLOBIN A1C: ICD-10-PCS | Mod: QW,,, | Performed by: FAMILY MEDICINE

## 2021-03-01 PROCEDURE — 99214 PR OFFICE/OUTPT VISIT, EST, LEVL IV, 30-39 MIN: ICD-10-PCS | Mod: S$GLB,,, | Performed by: FAMILY MEDICINE

## 2021-03-01 PROCEDURE — 99214 OFFICE O/P EST MOD 30 MIN: CPT | Mod: S$GLB,,, | Performed by: FAMILY MEDICINE

## 2021-03-01 PROCEDURE — 83036 HEMOGLOBIN GLYCOSYLATED A1C: CPT | Mod: QW,,, | Performed by: FAMILY MEDICINE

## 2021-03-01 RX ORDER — CHLORTHALIDONE 25 MG/1
25 TABLET ORAL DAILY
Qty: 30 TABLET | Refills: 5 | Status: SHIPPED | OUTPATIENT
Start: 2021-03-01 | End: 2021-06-15 | Stop reason: SDUPTHER

## 2021-03-01 RX ORDER — FINASTERIDE 5 MG/1
5 TABLET, FILM COATED ORAL DAILY
Qty: 30 TABLET | Refills: 6 | Status: SHIPPED | OUTPATIENT
Start: 2021-03-01 | End: 2021-06-15 | Stop reason: SDUPTHER

## 2021-03-01 RX ORDER — CLONIDINE HYDROCHLORIDE 0.1 MG/1
0.1 TABLET ORAL 2 TIMES DAILY
Qty: 60 TABLET | Refills: 5 | Status: SHIPPED | OUTPATIENT
Start: 2021-03-01 | End: 2021-06-15 | Stop reason: SDUPTHER

## 2021-03-01 RX ORDER — METHOCARBAMOL 500 MG/1
500 TABLET, FILM COATED ORAL 2 TIMES DAILY
Qty: 60 TABLET | Refills: 0 | Status: SHIPPED | OUTPATIENT
Start: 2021-03-01 | End: 2021-03-11

## 2021-03-01 RX ORDER — METFORMIN HYDROCHLORIDE 500 MG/1
500 TABLET ORAL 2 TIMES DAILY WITH MEALS
Qty: 60 TABLET | Refills: 5 | Status: SHIPPED | OUTPATIENT
Start: 2021-03-01 | End: 2021-06-15 | Stop reason: SDUPTHER

## 2021-03-31 ENCOUNTER — TELEPHONE (OUTPATIENT)
Dept: FAMILY MEDICINE | Facility: CLINIC | Age: 66
End: 2021-03-31

## 2021-06-15 ENCOUNTER — OFFICE VISIT (OUTPATIENT)
Dept: FAMILY MEDICINE | Facility: CLINIC | Age: 66
End: 2021-06-15
Payer: MEDICARE

## 2021-06-15 VITALS
WEIGHT: 205 LBS | HEIGHT: 68 IN | HEART RATE: 92 BPM | SYSTOLIC BLOOD PRESSURE: 174 MMHG | DIASTOLIC BLOOD PRESSURE: 110 MMHG | BODY MASS INDEX: 31.07 KG/M2

## 2021-06-15 DIAGNOSIS — M51.9 LUMBAR DISC DISEASE: ICD-10-CM

## 2021-06-15 DIAGNOSIS — N41.1 CHRONIC PROSTATITIS: ICD-10-CM

## 2021-06-15 DIAGNOSIS — I10 ESSENTIAL HYPERTENSION: ICD-10-CM

## 2021-06-15 DIAGNOSIS — N40.0 BENIGN PROSTATIC HYPERPLASIA WITHOUT LOWER URINARY TRACT SYMPTOMS: ICD-10-CM

## 2021-06-15 DIAGNOSIS — E11.65 UNCONTROLLED TYPE 2 DIABETES MELLITUS WITH HYPERGLYCEMIA: ICD-10-CM

## 2021-06-15 DIAGNOSIS — N52.9 ERECTILE DYSFUNCTION, UNSPECIFIED ERECTILE DYSFUNCTION TYPE: ICD-10-CM

## 2021-06-15 DIAGNOSIS — Z12.11 SCREENING FOR COLON CANCER: ICD-10-CM

## 2021-06-15 DIAGNOSIS — I10 ACCELERATED HYPERTENSION: ICD-10-CM

## 2021-06-15 DIAGNOSIS — M50.00 CERVICAL DISC DISEASE WITH MYELOPATHY: ICD-10-CM

## 2021-06-15 DIAGNOSIS — M70.62 TROCHANTERIC BURSITIS OF BOTH HIPS: ICD-10-CM

## 2021-06-15 DIAGNOSIS — M70.61 TROCHANTERIC BURSITIS OF BOTH HIPS: ICD-10-CM

## 2021-06-15 DIAGNOSIS — I10 WHITE COAT SYNDROME WITH DIAGNOSIS OF HYPERTENSION: Primary | ICD-10-CM

## 2021-06-15 DIAGNOSIS — E11.9 TYPE 2 DIABETES MELLITUS WITHOUT COMPLICATION, WITHOUT LONG-TERM CURRENT USE OF INSULIN: ICD-10-CM

## 2021-06-15 LAB — HBA1C MFR BLD: 6.2 %

## 2021-06-15 PROCEDURE — 83036 POCT HEMOGLOBIN A1C: ICD-10-PCS | Mod: QW,,, | Performed by: FAMILY MEDICINE

## 2021-06-15 PROCEDURE — 99214 PR OFFICE/OUTPT VISIT, EST, LEVL IV, 30-39 MIN: ICD-10-PCS | Mod: S$GLB,,, | Performed by: FAMILY MEDICINE

## 2021-06-15 PROCEDURE — 83036 HEMOGLOBIN GLYCOSYLATED A1C: CPT | Mod: QW,,, | Performed by: FAMILY MEDICINE

## 2021-06-15 PROCEDURE — 99214 OFFICE O/P EST MOD 30 MIN: CPT | Mod: S$GLB,,, | Performed by: FAMILY MEDICINE

## 2021-06-15 RX ORDER — CHLORTHALIDONE 25 MG/1
25 TABLET ORAL DAILY
Qty: 90 TABLET | Refills: 3 | Status: SHIPPED | OUTPATIENT
Start: 2021-06-15 | End: 2022-04-28 | Stop reason: SDUPTHER

## 2021-06-15 RX ORDER — METFORMIN HYDROCHLORIDE 500 MG/1
500 TABLET ORAL 2 TIMES DAILY WITH MEALS
Qty: 180 TABLET | Refills: 3 | Status: SHIPPED | OUTPATIENT
Start: 2021-06-15 | End: 2022-06-09 | Stop reason: SDUPTHER

## 2021-06-15 RX ORDER — SULFAMETHOXAZOLE AND TRIMETHOPRIM 800; 160 MG/1; MG/1
1 TABLET ORAL DAILY PRN
Qty: 90 TABLET | Refills: 3 | Status: SHIPPED | OUTPATIENT
Start: 2021-06-15 | End: 2021-08-16 | Stop reason: SDUPTHER

## 2021-06-15 RX ORDER — CLONIDINE HYDROCHLORIDE 0.1 MG/1
0.1 TABLET ORAL 2 TIMES DAILY
Qty: 180 TABLET | Refills: 3 | Status: SHIPPED | OUTPATIENT
Start: 2021-06-15 | End: 2022-04-28 | Stop reason: SDUPTHER

## 2021-06-15 RX ORDER — FINASTERIDE 5 MG/1
5 TABLET, FILM COATED ORAL DAILY
Qty: 90 TABLET | Refills: 3 | Status: SHIPPED | OUTPATIENT
Start: 2021-06-15 | End: 2022-06-09 | Stop reason: SDUPTHER

## 2021-06-18 LAB
ALBUMIN SERPL-MCNC: 4.6 G/DL (ref 3.6–5.1)
ALBUMIN/CREAT UR: 51 MCG/MG CREAT
ALBUMIN/GLOB SERPL: 1.7 (CALC) (ref 1–2.5)
ALP SERPL-CCNC: 54 U/L (ref 35–144)
ALT SERPL-CCNC: 18 U/L (ref 9–46)
AST SERPL-CCNC: 18 U/L (ref 10–35)
BILIRUB SERPL-MCNC: 0.6 MG/DL (ref 0.2–1.2)
BUN SERPL-MCNC: 24 MG/DL (ref 7–25)
BUN/CREAT SERPL: ABNORMAL (CALC) (ref 6–22)
CALCIUM SERPL-MCNC: 10.5 MG/DL (ref 8.6–10.3)
CHLORIDE SERPL-SCNC: 98 MMOL/L (ref 98–110)
CHOLEST SERPL-MCNC: 235 MG/DL
CHOLEST/HDLC SERPL: 4.4 (CALC)
CO2 SERPL-SCNC: 30 MMOL/L (ref 20–32)
CREAT SERPL-MCNC: 0.83 MG/DL (ref 0.7–1.25)
CREAT UR-MCNC: 49 MG/DL (ref 20–320)
GLOBULIN SER CALC-MCNC: 2.7 G/DL (CALC) (ref 1.9–3.7)
GLUCOSE SERPL-MCNC: 130 MG/DL (ref 65–99)
HDLC SERPL-MCNC: 53 MG/DL
LDLC SERPL CALC-MCNC: 132 MG/DL (CALC)
MICROALBUMIN UR-MCNC: 2.5 MG/DL
NONHDLC SERPL-MCNC: 182 MG/DL (CALC)
POTASSIUM SERPL-SCNC: 3.7 MMOL/L (ref 3.5–5.3)
PROT SERPL-MCNC: 7.3 G/DL (ref 6.1–8.1)
PSA SERPL-MCNC: 0.2 NG/ML
SODIUM SERPL-SCNC: 141 MMOL/L (ref 135–146)
TRIGL SERPL-MCNC: 347 MG/DL

## 2021-06-21 ENCOUNTER — TELEPHONE (OUTPATIENT)
Dept: FAMILY MEDICINE | Facility: CLINIC | Age: 66
End: 2021-06-21

## 2021-08-13 ENCOUNTER — TELEPHONE (OUTPATIENT)
Dept: FAMILY MEDICINE | Facility: CLINIC | Age: 66
End: 2021-08-13

## 2021-08-16 DIAGNOSIS — N41.1 CHRONIC PROSTATITIS: ICD-10-CM

## 2021-08-16 DIAGNOSIS — M50.00 CERVICAL DISC DISEASE WITH MYELOPATHY: Primary | ICD-10-CM

## 2021-08-16 RX ORDER — CYCLOBENZAPRINE HCL 10 MG
10 TABLET ORAL NIGHTLY PRN
Qty: 50 TABLET | Refills: 1 | Status: SHIPPED | OUTPATIENT
Start: 2021-08-16

## 2021-08-16 RX ORDER — SULFAMETHOXAZOLE AND TRIMETHOPRIM 800; 160 MG/1; MG/1
1 TABLET ORAL DAILY PRN
Qty: 90 TABLET | Refills: 3 | Status: SHIPPED | OUTPATIENT
Start: 2021-08-16

## 2021-08-16 RX ORDER — SULFAMETHOXAZOLE AND TRIMETHOPRIM 800; 160 MG/1; MG/1
1 TABLET ORAL DAILY PRN
Qty: 90 TABLET | Refills: 3 | Status: SHIPPED | OUTPATIENT
Start: 2021-08-16 | End: 2021-08-16 | Stop reason: SDUPTHER

## 2021-10-20 ENCOUNTER — TELEPHONE (OUTPATIENT)
Dept: FAMILY MEDICINE | Facility: CLINIC | Age: 66
End: 2021-10-20

## 2021-10-21 ENCOUNTER — OFFICE VISIT (OUTPATIENT)
Dept: FAMILY MEDICINE | Facility: CLINIC | Age: 66
End: 2021-10-21
Payer: MEDICARE

## 2021-10-21 VITALS
HEIGHT: 68 IN | WEIGHT: 205 LBS | DIASTOLIC BLOOD PRESSURE: 82 MMHG | BODY MASS INDEX: 31.07 KG/M2 | SYSTOLIC BLOOD PRESSURE: 138 MMHG | HEART RATE: 94 BPM

## 2021-10-21 DIAGNOSIS — N40.0 BENIGN PROSTATIC HYPERPLASIA WITHOUT LOWER URINARY TRACT SYMPTOMS: ICD-10-CM

## 2021-10-21 DIAGNOSIS — R53.83 OTHER FATIGUE: ICD-10-CM

## 2021-10-21 DIAGNOSIS — N41.1 CHRONIC PROSTATITIS: ICD-10-CM

## 2021-10-21 DIAGNOSIS — M51.9 LUMBAR DISC DISEASE: ICD-10-CM

## 2021-10-21 DIAGNOSIS — I10 ESSENTIAL HYPERTENSION: ICD-10-CM

## 2021-10-21 DIAGNOSIS — I10 WHITE COAT SYNDROME WITH DIAGNOSIS OF HYPERTENSION: ICD-10-CM

## 2021-10-21 DIAGNOSIS — R22.1 NECK MASS: Primary | ICD-10-CM

## 2021-10-21 DIAGNOSIS — R53.82 CHRONIC FATIGUE, UNSPECIFIED: ICD-10-CM

## 2021-10-21 DIAGNOSIS — I10 ACCELERATED HYPERTENSION: ICD-10-CM

## 2021-10-21 PROCEDURE — 99214 OFFICE O/P EST MOD 30 MIN: CPT | Mod: S$GLB,,, | Performed by: FAMILY MEDICINE

## 2021-10-21 PROCEDURE — 99214 PR OFFICE/OUTPT VISIT, EST, LEVL IV, 30-39 MIN: ICD-10-PCS | Mod: S$GLB,,, | Performed by: FAMILY MEDICINE

## 2021-10-23 LAB
CORTIS SERPL-MCNC: 23.6 MCG/DL
T3FREE SERPL-MCNC: 3.1 PG/ML (ref 2.3–4.2)
T4 FREE SERPL-MCNC: 1.2 NG/DL (ref 0.8–1.8)
TSH SERPL-ACNC: 2.14 MIU/L (ref 0.4–4.5)

## 2021-10-24 ENCOUNTER — PATIENT MESSAGE (OUTPATIENT)
Dept: FAMILY MEDICINE | Facility: CLINIC | Age: 66
End: 2021-10-24
Payer: MEDICARE

## 2021-10-27 ENCOUNTER — TELEPHONE (OUTPATIENT)
Dept: FAMILY MEDICINE | Facility: CLINIC | Age: 66
End: 2021-10-27
Payer: MEDICARE

## 2021-10-29 ENCOUNTER — TELEPHONE (OUTPATIENT)
Dept: FAMILY MEDICINE | Facility: CLINIC | Age: 66
End: 2021-10-29
Payer: MEDICARE

## 2021-10-29 ENCOUNTER — HOSPITAL ENCOUNTER (OUTPATIENT)
Dept: RADIOLOGY | Facility: HOSPITAL | Age: 66
Discharge: HOME | End: 2021-10-29
Attending: FAMILY MEDICINE
Payer: MEDICARE

## 2021-10-29 DIAGNOSIS — R22.1 NECK MASS: Primary | ICD-10-CM

## 2021-10-29 DIAGNOSIS — R22.1 NECK MASS: ICD-10-CM

## 2021-10-29 PROCEDURE — 76536 US EXAM OF HEAD AND NECK: CPT | Mod: TC,PO

## 2021-12-01 ENCOUNTER — PATIENT MESSAGE (OUTPATIENT)
Dept: FAMILY MEDICINE | Facility: CLINIC | Age: 66
End: 2021-12-01
Payer: MEDICARE

## 2021-12-20 ENCOUNTER — TELEPHONE (OUTPATIENT)
Dept: FAMILY MEDICINE | Facility: CLINIC | Age: 66
End: 2021-12-20
Payer: MEDICARE

## 2022-04-28 DIAGNOSIS — I10 WHITE COAT SYNDROME WITH DIAGNOSIS OF HYPERTENSION: ICD-10-CM

## 2022-04-28 RX ORDER — CHLORTHALIDONE 25 MG/1
25 TABLET ORAL DAILY
Qty: 90 TABLET | Refills: 0 | Status: SHIPPED | OUTPATIENT
Start: 2022-04-28 | End: 2022-06-09 | Stop reason: SDUPTHER

## 2022-04-28 RX ORDER — CLONIDINE HYDROCHLORIDE 0.1 MG/1
0.1 TABLET ORAL 2 TIMES DAILY
Qty: 180 TABLET | Refills: 0 | Status: SHIPPED | OUTPATIENT
Start: 2022-04-28 | End: 2022-06-09 | Stop reason: SDUPTHER

## 2022-04-28 NOTE — TELEPHONE ENCOUNTER
----- Message from Pilar Medrano sent at 4/28/2022  3:47 PM CDT -----  Pt calling for refills on Chlorthalidone 25 mg and Clonidine 0.1 mg [facility]  He is going on an extended 50 day vacation and needs enough to last.  [phone]

## 2022-06-09 DIAGNOSIS — I10 WHITE COAT SYNDROME WITH DIAGNOSIS OF HYPERTENSION: ICD-10-CM

## 2022-06-09 DIAGNOSIS — N41.1 CHRONIC PROSTATITIS: ICD-10-CM

## 2022-06-09 DIAGNOSIS — E11.9 TYPE 2 DIABETES MELLITUS WITHOUT COMPLICATION, WITHOUT LONG-TERM CURRENT USE OF INSULIN: ICD-10-CM

## 2022-06-09 RX ORDER — METFORMIN HYDROCHLORIDE 500 MG/1
500 TABLET ORAL 2 TIMES DAILY WITH MEALS
Qty: 60 TABLET | Refills: 0 | Status: SHIPPED | OUTPATIENT
Start: 2022-06-09 | End: 2022-07-20 | Stop reason: SDUPTHER

## 2022-06-09 RX ORDER — FINASTERIDE 5 MG/1
5 TABLET, FILM COATED ORAL DAILY
Qty: 30 TABLET | Refills: 0 | Status: SHIPPED | OUTPATIENT
Start: 2022-06-09 | End: 2022-06-09

## 2022-06-09 RX ORDER — METFORMIN HYDROCHLORIDE 500 MG/1
500 TABLET ORAL 2 TIMES DAILY WITH MEALS
Qty: 60 TABLET | Refills: 0 | Status: SHIPPED | OUTPATIENT
Start: 2022-06-09 | End: 2022-06-09

## 2022-06-09 RX ORDER — FINASTERIDE 5 MG/1
5 TABLET, FILM COATED ORAL DAILY
Qty: 30 TABLET | Refills: 0 | Status: SHIPPED | OUTPATIENT
Start: 2022-06-09 | End: 2022-07-20 | Stop reason: SDUPTHER

## 2022-06-09 RX ORDER — CHLORTHALIDONE 25 MG/1
25 TABLET ORAL DAILY
Qty: 30 TABLET | Refills: 0 | Status: SHIPPED | OUTPATIENT
Start: 2022-06-09 | End: 2022-07-20 | Stop reason: SDUPTHER

## 2022-06-09 RX ORDER — CLONIDINE HYDROCHLORIDE 0.1 MG/1
0.1 TABLET ORAL 2 TIMES DAILY
Qty: 30 TABLET | Refills: 0 | Status: SHIPPED | OUTPATIENT
Start: 2022-06-09 | End: 2022-06-21 | Stop reason: SDUPTHER

## 2022-06-09 NOTE — TELEPHONE ENCOUNTER
----- Message from Mildred Gallagher MA sent at 6/9/2022 10:59 AM CDT -----  Regarding: refills needed  Patient needs refills on chlorthalidone 25 mg, clonidine, metformin 500 mg, finasteride 5 mg  [facility]  and [address]  [phone]

## 2022-06-09 NOTE — TELEPHONE ENCOUNTER
Spoke to patient regarding refills but he is out of town currently in the process of moving. He is aware that he will need a OV before we can do anymore refills after today.

## 2022-06-21 DIAGNOSIS — I10 WHITE COAT SYNDROME WITH DIAGNOSIS OF HYPERTENSION: ICD-10-CM

## 2022-06-21 RX ORDER — CLONIDINE HYDROCHLORIDE 0.1 MG/1
0.1 TABLET ORAL 3 TIMES DAILY
Qty: 90 TABLET | Refills: 0 | Status: SHIPPED | OUTPATIENT
Start: 2022-06-21 | End: 2022-07-20 | Stop reason: SDUPTHER

## 2022-06-21 NOTE — TELEPHONE ENCOUNTER
----- Message from Pilar Medrano sent at 6/21/2022  3:09 PM CDT -----  Pt calling about the Clonidine 0.1 mg said we called in for 1 tab per day but he was taken 3 tablets a day. He can barely keep his BP under control with three but 1 or 2 will def not do the job. Cb # [phone]

## 2022-07-18 ENCOUNTER — TELEPHONE (OUTPATIENT)
Dept: FAMILY MEDICINE | Facility: CLINIC | Age: 67
End: 2022-07-18

## 2022-07-20 DIAGNOSIS — N41.1 CHRONIC PROSTATITIS: ICD-10-CM

## 2022-07-20 DIAGNOSIS — E11.9 TYPE 2 DIABETES MELLITUS WITHOUT COMPLICATION, WITHOUT LONG-TERM CURRENT USE OF INSULIN: ICD-10-CM

## 2022-07-20 DIAGNOSIS — I10 WHITE COAT SYNDROME WITH DIAGNOSIS OF HYPERTENSION: ICD-10-CM

## 2022-07-20 RX ORDER — CLONIDINE HYDROCHLORIDE 0.1 MG/1
0.1 TABLET ORAL 3 TIMES DAILY
Qty: 90 TABLET | Refills: 0 | Status: SHIPPED | OUTPATIENT
Start: 2022-07-20 | End: 2022-08-19

## 2022-07-20 RX ORDER — CHLORTHALIDONE 25 MG/1
25 TABLET ORAL DAILY
Qty: 30 TABLET | Refills: 0 | Status: SHIPPED | OUTPATIENT
Start: 2022-07-20 | End: 2022-08-19

## 2022-07-20 RX ORDER — METFORMIN HYDROCHLORIDE 500 MG/1
500 TABLET ORAL 2 TIMES DAILY WITH MEALS
Qty: 60 TABLET | Refills: 0 | Status: SHIPPED | OUTPATIENT
Start: 2022-07-20 | End: 2022-08-19

## 2022-07-20 RX ORDER — FINASTERIDE 5 MG/1
5 TABLET, FILM COATED ORAL DAILY
Qty: 30 TABLET | Refills: 0 | Status: SHIPPED | OUTPATIENT
Start: 2022-07-20 | End: 2022-08-19

## 2022-07-20 NOTE — TELEPHONE ENCOUNTER
"Spoke with pt who states they are still in [place] - states his wife had a fall when they were up there and she had already had to have one operation and will need another. I explained I can ask a provider if we can send over 1 more month of prescriptions but we have to see him in the office. Patient refused to schedule, states he doesn't know when he will be able to get back down here but his nephew is coming up there Friday and if he is able to take care of her he will be able to come down. I explained to the patient that we are not able to continue refilling medications without seeing him as we have not been able to monitor blood pressure or blood sugar in over 6 months and it would be dangerous for him for us to keep refilling without monitoring and labs. Patient verbalized understanding but still refused to schedule and states he is not going to get a PCP where he is because "I don't plan on doing all of that" I again expressed I was only able to ask for 30 days worth and no further. Would have to be evaluated closer to where he is.  "

## 2022-07-20 NOTE — TELEPHONE ENCOUNTER
----- Message from Pilar Gallagher sent at 7/20/2022  2:29 PM CDT -----  Patient called and stated that he need a refill of his chlorthalidone, clonidine,metformin, finasteride,and his sulfamethoxazole-trimethoprim called into [facility] on  if any questions please give him a call at [phone]

## 2022-12-19 ENCOUNTER — PATIENT MESSAGE (OUTPATIENT)
Dept: FAMILY MEDICINE | Facility: CLINIC | Age: 67
End: 2022-12-19

## 2022-12-19 ENCOUNTER — TELEPHONE (OUTPATIENT)
Dept: FAMILY MEDICINE | Facility: CLINIC | Age: 67
End: 2022-12-19

## 2023-01-01 NOTE — Clinical Note
Please call patient and set up a blood pressure office visit in 4 months.
Janie Brown  (NP)  2023 12:52:21

## 2023-04-11 ENCOUNTER — PATIENT MESSAGE (OUTPATIENT)
Dept: ADMINISTRATIVE | Facility: HOSPITAL | Age: 68
End: 2023-04-11

## 2024-07-09 ENCOUNTER — PATIENT MESSAGE (OUTPATIENT)
Dept: ADMINISTRATIVE | Facility: HOSPITAL | Age: 69
End: 2024-07-09